# Patient Record
(demographics unavailable — no encounter records)

---

## 2024-10-08 NOTE — HISTORY OF PRESENT ILLNESS
[Denies] : Denies [No] : No [Yes] : Yes [Declined] : Declined [Informed consent documented in EHR.] : Informed consent documented in EHR. [TB] : Tuberculosis screening [Hep acute panel] : Hepatitis acute panel [Right upper extremity] : Right upper extremity [24g] : 24g [Start Time: ___] : Medication Start Time: [unfilled] [End Time: ___] : Medication End Time: [unfilled] [Medication Name: ___] : Medication Name: [unfilled] [Total Amount Administered: ___] : Total Amount Administered: [unfilled] [IV discontinued. Intact. No signs or symptoms of IV complications noted. Time: ___] : IV discontinued. Intact. No signs or symptoms of IV complications noted. Time: [unfilled] [Patient  instructed to seek medical attention with signs and symptoms of adverse effects] : Patient  instructed to seek medical attention with signs and symptoms of adverse effects [Patient left unit in no acute distress] : Patient left unit in no acute distress [Medications administered as ordered and tolerated well.] : Medications administered as ordered and tolerated well. [de-identified] : forearm [de-identified] : next appt 11/25

## 2024-11-05 NOTE — HISTORY OF PRESENT ILLNESS
[Denies] : Denies [No] : No [Yes] : Yes [Declined] : Declined [Informed consent documented in EHR.] : Informed consent documented in EHR. [TB] : Tuberculosis screening [Hep acute panel] : Hepatitis acute panel [Right upper extremity] : Right upper extremity [24g] : 24g [Start Time: ___] : Medication Start Time: [unfilled] [End Time: ___] : Medication End Time: [unfilled] [Medication Name: ___] : Medication Name: [unfilled] [Total Amount Administered: ___] : Total Amount Administered: [unfilled] [IV discontinued. Intact. No signs or symptoms of IV complications noted. Time: ___] : IV discontinued. Intact. No signs or symptoms of IV complications noted. Time: [unfilled] [Patient  instructed to seek medical attention with signs and symptoms of adverse effects] : Patient  instructed to seek medical attention with signs and symptoms of adverse effects [Patient left unit in no acute distress] : Patient left unit in no acute distress [Medications administered as ordered and tolerated well.] : Medications administered as ordered and tolerated well. [de-identified] : next appt 12/3/24

## 2024-12-03 NOTE — HISTORY OF PRESENT ILLNESS
[Denies] : Denies [No] : No [Yes] : Yes [Declined] : Declined [Informed consent documented in EHR.] : Informed consent documented in EHR. [TB] : Tuberculosis screening [Hep acute panel] : Hepatitis acute panel [Right upper extremity] : Right upper extremity [24g] : 24g [Start Time: ___] : Medication Start Time: [unfilled] [End Time: ___] : Medication End Time: [unfilled] [Medication Name: ___] : Medication Name: [unfilled] [Total Amount Administered: ___] : Total Amount Administered: [unfilled] [IV discontinued. Intact. No signs or symptoms of IV complications noted. Time: ___] : IV discontinued. Intact. No signs or symptoms of IV complications noted. Time: [unfilled] [Patient  instructed to seek medical attention with signs and symptoms of adverse effects] : Patient  instructed to seek medical attention with signs and symptoms of adverse effects [Patient left unit in no acute distress] : Patient left unit in no acute distress [Medications administered as ordered and tolerated well.] : Medications administered as ordered and tolerated well. [de-identified] : forearm [de-identified] : next appt 1/2/25

## 2025-01-02 NOTE — HISTORY OF PRESENT ILLNESS
[Denies] : Denies [No] : No [Yes] : Yes [Declined] : Declined [Informed consent documented in EHR.] : Informed consent documented in EHR. [Right upper extremity] : Right upper extremity [24g] : 24g [Start Time: ___] : Medication Start Time: [unfilled] [End Time: ___] : Medication End Time: [unfilled] [Medication Name: ___] : Medication Name: [unfilled] [Total Amount Administered: ___] : Total Amount Administered: [unfilled] [IV discontinued. Intact. No signs or symptoms of IV complications noted. Time: ___] : IV discontinued. Intact. No signs or symptoms of IV complications noted. Time: [unfilled] [Patient  instructed to seek medical attention with signs and symptoms of adverse effects] : Patient  instructed to seek medical attention with signs and symptoms of adverse effects [Patient left unit in no acute distress] : Patient left unit in no acute distress [Medications administered as ordered and tolerated well.] : Medications administered as ordered and tolerated well. [de-identified] : forearm [de-identified] : next appt 1/28/25

## 2025-01-02 NOTE — ASSESSMENT
[FreeTextEntry1] : 71 y/o female presents as follow up of RA.  Pt was diagnosed with fibromyalgia and RA since 2014  Patient has been followed by [facility]. Labwork with positive ЕЛЕНА, negative RF, CCP, HLA-B27. AVISE with positive ЕЛЕНА but rest negative. Vectra (5/2019) was 33. Negative TB, Hep B/C 7/2020.  XRs of hands and knees with DJD, US (5/2019) showed synovitis of small hand joints and 1st MTP and possible erosion of MCP. MR L-spine (2/2021) with DDD with severe central spinal canal stenosis with possible impingement of the cauda equina.   Patient was previously on MTX (hair loss), HCQ (eye issue), Actemra (rash), Humira (injection site reaction), Cimzia (recurrent URIs), Xeljanz (recurrent infections). Patient has sulfa allergy and SSZ was not tried.   Patient is currently on Orencia infusions (750mg [id number]), since 11/2020 and continued by me. Orencia keeps pt's RA under control (>70% improvement in joint pains/stiffness) without any side effects. Labwork by me with ЕЛЕНА+, negative RA markers, inflammatory markers.   Pt also has spinal stenosis and has received steroid injection with great improvement.  Pt has L foot arthritis receiving regular steroid injection by podiatry with good relief.  Pt was seen by hand ortho in past for DIP nodes, likely secondary to OA. Advised on NSAIDs PRN and to return if refractory for possible steroid injection  Pt also uses Celebrex PRN which greatly helps with the pains.   Pt continues on Orencia infusions with continued control of her RA (pt does feel worsening pain at the end of the dosing month). However, pt would like to see if she can switch over to SQ Orencia.  - Last labwork 11/2024 by PCP in [facility]. No need for labwork today. - Rx Orencia SQ 125mg SQ QWeekly instead of current IV infusions. Pt has previously failed HCQ, MTX, Humira, Cimzia, Actemra, Xeljanz.  - Pen injector was ordered since patient is unable to maneuver syringe due to hand pains. - Last Hep B/C and TB negative 6/2024  - c/w celebrex 200mg PO BID PRN for OA pains. I advised that the NSAID should be taken with food. In addition while taking the prescribed NSAID, no over the counter or other NSAIDs should be used, such as ibuprofen (Motrin or Advil) or naproxen (Aleve) as this can cause stomach upset or other side effects. If needed for fever or breakthrough pain Tylenol can be used.  - Pt to call if she would like to try different NSAIDs PRN  - RTC 6 months for follow up or earlier if needed.

## 2025-01-02 NOTE — PHYSICAL EXAM
[TextEntry] : GENERAL: Appears in no acute distress HEENT: EOMI, PERRLA. No conjunctival erythema. Moist mucous membranes. No nasopharyngeal ulcers NECK: Supple, no cervical lymphadenopathy, no thyromegaly CARDIOVASCULAR: RRR. S1, S2 auscultated. No murmurs or rubs. PULMONARY: Clear to auscultation b/l, no wheezes, rales, or crackles ABDOMINAL: Soft, nontender, nondistended. Bowel sounds present. No organomegaly. MSK: General synovial hypertrophy of b/l MCPs, PIPs without overt synovitis, swelling, erythema, or warmth. Diffuse Heberden's nodes b/l hands No significant joint tenderness to palpation. No crepitus. SKIN: No lesions or rashes

## 2025-01-02 NOTE — HISTORY OF PRESENT ILLNESS
[FreeTextEntry1] : HISTORY:  71 y/o female presents as follow up of RA.  Pt was diagnosed with fibromyalgia and RA since 2014  Patient has been followed by [facility]. Labwork with positive ЕЛЕНА, negative RF, CCP, HLA-B27. AVISE with positive ЕЛЕНА but rest negative. Vectra (5/2019) was 33. Negative TB, Hep B/C 7/2020.  XRs of hands and knees with DJD, US (5/2019) showed synovitis of small hand joints and 1st MTP and possible erosion of MCP. MR L-spine (2/2021) with DDD with severe central spinal canal stenosis with possible impingement of the cauda equina.   Patient was previously on MTX (hair loss), HCQ (eye issue), Actemra (rash), Humira (injection site reaction), Cimzia (recurrent URIs), Xeljanz (recurrent infections). Patient has sulfa allergy and SSZ was not tried.   Patient is currently on Orencia infusions (750mg [id number]), since 11/2020 and continued by me. Orencia keeps pt's RA under control (>70% improvement in joint pains/stiffness) without any side effects. Labwork by me with ЕЛЕНА+, negative RA markers, inflammatory markers.   Pt also has spinal stenosis and has received steroid injection with great improvement.  Pt has L foot arthritis receiving regular steroid injection by podiatry with good relief.  Pt was seen by hand ortho in past for DIP nodes, likely secondary to OA. Advised on NSAIDs PRN and to return if refractory for possible steroid injection  Pt also uses Celebrex PRN which greatly helps with the pains.   INTERVAL HISTORY:  Pt here for 6 months follow up. Pt continues on Orencia infusions with continued control of her RA (pt does feel worsening pain at the end of the dosing month). However, pt would like to see if she can switch over to SQ Orencia.  WORKUP:  Remarkable for (6/2022): ЕЛЕНА 1:320 DFS70  Normal/neg (6/2022): CBC, CMP, ESR, CRP, dsDNA, SSA, SSB, Smith, RNP, C3, C4, RF, CCP, Hep B/C, Quantiferon TB  XR b/l hands (6/2023): OA changes of R 1st CMC, 2nd, 3rd, 4th DIPs  XR L foot (6/2023): OA changes of 1st MTP  DXA (6/2023): Normal

## 2025-01-28 NOTE — HISTORY OF PRESENT ILLNESS
[Denies] : Denies [No] : No [Yes] : Yes [Informed consent documented in EHR.] : Informed consent documented in EHR. [TB] : Tuberculosis screening [Hep acute panel] : Hepatitis acute panel [Left upper extremity] : Left upper extremity [24g] : 24g [Start Time: ___] : Medication Start Time: [unfilled] [End Time: ___] : Medication End Time: [unfilled] [Medication Name: ___] : Medication Name: [unfilled] [Total Amount Administered: ___] : Total Amount Administered: [unfilled] [IV discontinued. Intact. No signs or symptoms of IV complications noted. Time: ___] : IV discontinued. Intact. No signs or symptoms of IV complications noted. Time: [unfilled] [Patient  instructed to seek medical attention with signs and symptoms of adverse effects] : Patient  instructed to seek medical attention with signs and symptoms of adverse effects [Patient left unit in no acute distress] : Patient left unit in no acute distress [Medications administered as ordered and tolerated well.] : Medications administered as ordered and tolerated well. [de-identified] : forearm [de-identified] : next appt 2/25/25

## 2025-02-25 NOTE — HISTORY OF PRESENT ILLNESS
[Denies] : Denies [Informed consent documented in EHR.] : Informed consent documented in EHR. [TB] : Tuberculosis screening [Hep acute panel] : Hepatitis acute panel [Left upper extremity] : Left upper extremity [22g] : 22g [Start Time: ___] : Medication Start Time: [unfilled] [End Time: ___] : Medication End Time: [unfilled] [Medication Name: ___] : Medication Name: [unfilled] [Total Amount Administered: ___] : Total Amount Administered: [unfilled] [IV discontinued. Intact. No signs or symptoms of IV complications noted. Time: ___] : IV discontinued. Intact. No signs or symptoms of IV complications noted. Time: [unfilled] [Patient  instructed to seek medical attention with signs and symptoms of adverse effects] : Patient  instructed to seek medical attention with signs and symptoms of adverse effects [Patient left unit in no acute distress] : Patient left unit in no acute distress [Medications administered as ordered and tolerated well.] : Medications administered as ordered and tolerated well. [de-identified] : next appt 3/24/25

## 2025-03-24 NOTE — HISTORY OF PRESENT ILLNESS
[Denies] : Denies [No] : No [Yes] : Yes [Declined] : Declined [Informed consent documented in EHR.] : Informed consent documented in EHR. [TB] : Tuberculosis screening [Hep acute panel] : Hepatitis acute panel [Left upper extremity] : Left upper extremity [24g] : 24g [Start Time: ___] : Medication Start Time: [unfilled] [End Time: ___] : Medication End Time: [unfilled] [Medication Name: ___] : Medication Name: [unfilled] [Total Amount Administered: ___] : Total Amount Administered: [unfilled] [IV discontinued. Intact. No signs or symptoms of IV complications noted. Time: ___] : IV discontinued. Intact. No signs or symptoms of IV complications noted. Time: [unfilled] [Patient  instructed to seek medical attention with signs and symptoms of adverse effects] : Patient  instructed to seek medical attention with signs and symptoms of adverse effects [Patient left unit in no acute distress] : Patient left unit in no acute distress [Medications administered as ordered and tolerated well.] : Medications administered as ordered and tolerated well. [de-identified] : forearm [de-identified] : next appt 4/22/25

## 2025-04-22 NOTE — HISTORY OF PRESENT ILLNESS
[Denies] : Denies [No] : No [Yes] : Yes [Declined] : Declined [Informed consent documented in EHR.] : Informed consent documented in EHR. [TB] : Tuberculosis screening [Hep acute panel] : Hepatitis acute panel [Right upper extremity] : Right upper extremity [22g] : 22g [Start Time: ___] : Medication Start Time: [unfilled] [End Time: ___] : Medication End Time: [unfilled] [Medication Name: ___] : Medication Name: [unfilled] [Total Amount Administered: ___] : Total Amount Administered: [unfilled] [IV discontinued. Intact. No signs or symptoms of IV complications noted. Time: ___] : IV discontinued. Intact. No signs or symptoms of IV complications noted. Time: [unfilled] [Patient  instructed to seek medical attention with signs and symptoms of adverse effects] : Patient  instructed to seek medical attention with signs and symptoms of adverse effects [Patient left unit in no acute distress] : Patient left unit in no acute distress [Medications administered as ordered and tolerated well.] : Medications administered as ordered and tolerated well. [de-identified] : forearm [de-identified] : Pt having procedure 5/8/28  next appt 6/10/25

## 2025-06-10 NOTE — HISTORY OF PRESENT ILLNESS
[Denies] : Denies [No] : No [Yes] : Yes [Declined] : Declined [Informed consent documented in EHR.] : Informed consent documented in EHR. [Hep acute panel] : Hepatitis acute panel [TB] : Tuberculosis screening [Left upper extremity] : Left upper extremity [24g] : 24g [Start Time: ___] : Medication Start Time: [unfilled] [End Time: ___] : Medication End Time: [unfilled] [Medication Name: ___] : Medication Name: [unfilled] [Total Amount Administered: ___] : Total Amount Administered: [unfilled] [IV discontinued. Intact. No signs or symptoms of IV complications noted. Time: ___] : IV discontinued. Intact. No signs or symptoms of IV complications noted. Time: [unfilled] [Patient  instructed to seek medical attention with signs and symptoms of adverse effects] : Patient  instructed to seek medical attention with signs and symptoms of adverse effects [Patient left unit in no acute distress] : Patient left unit in no acute distress [Blood drawn at time of visit] : Blood drawn at time of visit [Medications administered as ordered and tolerated well.] : Medications administered as ordered and tolerated well. [de-identified] : forearm [de-identified] : next appt 7/8/25

## 2025-07-03 NOTE — HISTORY OF PRESENT ILLNESS
[FreeTextEntry1] : HISTORY: 71 y/o female presents as follow up of RA.  Pt was diagnosed with fibromyalgia and RA since 2014  Patient has been followed by [facility]. Labwork with positive ЕЛЕНА, negative RF, CCP, HLA-B27. AVISE with positive ЕЛЕНА but rest negative. Vectra (5/2019) was 33. Negative TB, Hep B/C 7/2020.  XRs of hands and knees with DJD, US (5/2019) showed synovitis of small hand joints and 1st MTP and possible erosion of MCP. MR L-spine (2/2021) with DDD with severe central spinal canal stenosis with possible impingement of the cauda equina.   Patient was previously on MTX (hair loss), HCQ (eye issue), Actemra (rash), Humira (injection site reaction), Cimzia (recurrent URIs), Xeljanz (recurrent infections). Patient has sulfa allergy and SSZ was not tried.   Patient is currently on Orencia infusions (750mg [id number]), since 11/2020 and continued by me. Orencia keeps pt's RA under control (>70% improvement in joint pains/stiffness) without any side effects. Labwork by me with ЕЛЕНА+, negative RA markers, inflammatory markers.   Pt also has spinal stenosis and has received steroid injection with great improvement.  Pt has L foot arthritis receiving regular steroid injection by podiatry with good relief.  Pt was seen by hand ortho in past for DIP nodes, likely secondary to OA. Advised on NSAIDs PRN and to return if refractory for possible steroid injection  Pt also uses Celebrex PRN which greatly helps with the pains.   INTERVAL HISTORY: Pt continues on Orencia infusions with continued control of her RA (pt does feel worsening pain at the end of the dosing month). Pt tried to be switched over to SQ Orencia but could not due to high copay. Pt had CTS surgery of R wrist in 5/2025 and recovering from it.  WORKUP:  Remarkable for (6/2022): ЕЛЕНА 1:320 DFS70  Normal/neg (6/2022): CBC, CMP, ESR, CRP, dsDNA, SSA, SSB, Smith, RNP, C3, C4, RF, CCP, Hep B/C, Quantiferon TB  XR b/l hands (6/2023): OA changes of R 1st CMC, 2nd, 3rd, 4th DIPs  XR L foot (6/2023): OA changes of 1st MTP  DXA (6/2023): Normal

## 2025-07-03 NOTE — ASSESSMENT
[FreeTextEntry1] : 73 y/o female presents as follow up of RA.  Pt was diagnosed with fibromyalgia and RA since 2014  Patient has been followed by [facility]. Labwork with positive ЕЛЕНА, negative RF, CCP, HLA-B27. AVISE with positive ЕЛЕНА but rest negative. Vectra (5/2019) was 33. Negative TB, Hep B/C 7/2020.  XRs of hands and knees with DJD, US (5/2019) showed synovitis of small hand joints and 1st MTP and possible erosion of MCP. MR L-spine (2/2021) with DDD with severe central spinal canal stenosis with possible impingement of the cauda equina.   Patient was previously on MTX (hair loss), HCQ (eye issue), Actemra (rash), Humira (injection site reaction), Cimzia (recurrent URIs), Xeljanz (recurrent infections). Patient has sulfa allergy and SSZ was not tried.   Patient is currently on Orencia infusions (750mg [id number]), since 11/2020 and continued by me. Orencia keeps pt's RA under control (>70% improvement in joint pains/stiffness) without any side effects. Labwork by me with ЕЛЕНА+, negative RA markers, inflammatory markers.   Pt also has spinal stenosis and has received steroid injection with great improvement.  Pt has L foot arthritis receiving regular steroid injection by podiatry with good relief.  Pt was seen by hand ortho in past for DIP nodes, likely secondary to OA. Advised on NSAIDs PRN and to return if refractory for possible steroid injection  Pt also uses Celebrex PRN which greatly helps with the pains.   Pt continues on Orencia infusions with continued control of her RA (pt does feel worsening pain at the end of the dosing month). Pt tried to be switched over to SQ Orencia but could not due to high copay. Pt had CTS surgery of R wrist in 5/2025 and recovering from it.  - Pt had labwork by me in 6/2025. No need for labwork today. - c/w Orencia IV infusions. Pt has previously failed HCQ, MTX, Humira, Cimzia, Actemra, Xeljanz.  - Last Hep B/C and TB negative 6/2025 - c/w celebrex 200mg PO BID PRN for OA pains. I advised that the NSAID should be taken with food. In addition while taking the prescribed NSAID, no over the counter or other NSAIDs should be used, such as ibuprofen (Motrin or Advil) or naproxen (Aleve) as this can cause stomach upset or other side effects. If needed for fever or breakthrough pain Tylenol can be used.  - RTC 6 months for follow up or earlier if needed.

## 2025-07-08 NOTE — HISTORY OF PRESENT ILLNESS
[Denies] : Denies [No] : No [Yes] : Yes [Declined] : Declined [Informed consent documented in EHR.] : Informed consent documented in EHR. [TB] : Tuberculosis screening [Hep acute panel] : Hepatitis acute panel [Left upper extremity] : Left upper extremity [24g] : 24g [Start Time: ___] : Medication Start Time: [unfilled] [End Time: ___] : Medication End Time: [unfilled] [Medication Name: ___] : Medication Name: [unfilled] [Total Amount Administered: ___] : Total Amount Administered: [unfilled] [IV discontinued. Intact. No signs or symptoms of IV complications noted. Time: ___] : IV discontinued. Intact. No signs or symptoms of IV complications noted. Time: [unfilled] [Patient  instructed to seek medical attention with signs and symptoms of adverse effects] : Patient  instructed to seek medical attention with signs and symptoms of adverse effects [Patient left unit in no acute distress] : Patient left unit in no acute distress [Medications administered as ordered and tolerated well.] : Medications administered as ordered and tolerated well. [de-identified] : forearm [de-identified] : next appt 8/11/25